# Patient Record
(demographics unavailable — no encounter records)

---

## 2020-05-26 NOTE — EMERGENCY DEPARTMENT REPORT
ED Back Pain/Injury HPI





- General


Chief Complaint: Back Pain/Injury


Stated Complaint: PAIN IN BACK


Time Seen by Provider: 05/26/20 09:06


Source: patient


Limitations: No Limitations





- History of Present Illness


Initial Comments: 





This is a 46-year-old female with a history of back pain who presents the ED 

complaining of lower bilateral pain radiating down her thighs.  Patient states 

that the symptoms have been worsening for the past 3 days.  She denies any 

fever, chills, nausea vomiting, urinary symptoms or falls or trauma.


MD Complaint: back pain


Similar Symptoms Previously: Yes


Place: home





- Related Data


                                  Previous Rx's











 Medication  Instructions  Recorded  Last Taken  Type


 


tiZANidine [Zanaflex 4mg TAB] 4 mg PO BID #30 tablet 05/26/20 Unknown Rx











                                    Allergies











Allergy/AdvReac Type Severity Reaction Status Date / Time


 


No Known Allergies Allergy   Unverified 05/26/20 08:11














ED Review of Systems


ROS: 


Stated complaint: PAIN IN BACK


Other details as noted in HPI





Comment: All other systems reviewed and negative





ED Past Medical Hx





- Past Medical History


Rheumatoid arthritis





ED Back Pain Physical Exam





- Exam


General: 


Vital signs noted. No distress. Alert and acting appropriately.





Back/Abdomen: No Abdominal Tenderness, No Perithoracic Tenderness, No Perilumbar

Tenderness, No Sacroiliac Tenderness, No Flank Tenderness, No Straight Leg Raise

Pain


Neuro: Yes Normal Sensation, Yes Normal DTR's, Yes Normal Gait, No Motor 

Weakness





ED Course


                                   Vital Signs











  05/26/20





  08:14


 


Temperature 98.5 F


 


Pulse Rate 68


 


Respiratory 18





Rate 


 


Blood Pressure 143/95


 


O2 Sat by Pulse 97





Oximetry 














ED Medical Decision Making





- Medical Decision Making





46-year-old female presents to ED with lumbar radiculopathy with bilateral 

sciatica


ED course: Patient received Toradol and Decadron in ED.


Vital signs are normal patient is in no acute distress


Discussed with patient follow-up with primary care physician.  


Discussed the patient and take medications as prescribed. 


Patient has no neurological deficit.  Patient is alert and oriented 3  and 

understands all instructions given.


Discussed follow-up with neurologist.


Critical care attestation.: 


If time is entered above; I have spent that time in minutes in the direct care 

of this critically ill patient, excluding procedure time.








ED Disposition


Clinical Impression: 


 Lumbar radiculopathy, Sciatic nerve pain





Disposition: DC-01 TO HOME OR SELFCARE


Is pt being admited?: No


Does the pt Need Aspirin: No


Condition: Stable


Instructions:  Sciatica (ED), Lumbar Radiculopathy (ED)


Additional Instructions: 


Make sure to follow up with the primary care physician as discussed.


Take all your medications as you've been prescribed.


If you have any worsening symptoms or develop new symptoms please return to ED 

immediately.


Prescriptions: 


tiZANidine [Zanaflex 4mg TAB] 4 mg PO BID #30 tablet


Referrals: 


KRISTOPHER TURNER MD [Primary Care Provider] - 3-5 Days


AVA GONZALEZ MD [Staff Physician] - 3-5 Days


[facility] [Outside] - 3-5 Days


[facility] [Provider Group] - 3-5 Days


[facility] [Provider Group] - 3-5 Days


Forms:  Work/School Release Form(ED)


Time of Disposition: 10:11
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
